# Patient Record
(demographics unavailable — no encounter records)

---

## 2025-03-27 NOTE — PHYSICAL EXAM
[Well Developed] : well developed [Well Nourished] : well nourished [Normal S1, S2] : normal S1, S2 [No Murmur] : no murmur [Normal] : clear lung fields, good air entry, no respiratory distress [Normal Gait] : normal gait [No Edema] : no edema

## 2025-03-27 NOTE — CARDIOLOGY SUMMARY
[de-identified] : NSR [de-identified] : 11/2022 johnnie willis unremarkable  [de-identified] : 2017 treadmill echo unremarkable  [de-identified] : \par  12/2022 LABWORK: normal tsh. . trig 104. thcol 178. hdl 42. a1c 5.5. normal lpa \par  9/2022 Labwork: Grossly normal CRP/ESR/CMP/CK/CBC.  TSH mildly elevated\par  2/2022: LABWORK: tchol 211, , HDL 49, trig 128\par

## 2025-03-27 NOTE — CONSULT LETTER
[Dear  ___] : Dear  [unfilled], [Consult Letter:] : I had the pleasure of evaluating your patient, [unfilled]. [Consult Closing:] : Thank you very much for allowing me to participate in the care of this patient.  If you have any questions, please do not hesitate to contact me. [Sincerely,] : Sincerely, [FreeTextEntry3] : Myriam Ansari, WALTER, ANP-c

## 2025-03-27 NOTE — REASON FOR VISIT
[Symptom and Test Evaluation] : symptom and test evaluation [CV Risk Factors and Non-Cardiac Disease] : CV risk factors and non-cardiac disease [FreeTextEntry3] : Dr. Kodak Alvarez

## 2025-03-27 NOTE — PHYSICAL EXAM
[Fully active, able to carry on all pre-disease performance without restriction] : Status 0 - Fully active, able to carry on all pre-disease performance without restriction [Normal] : affect appropriate [de-identified] : wears glasses

## 2025-03-27 NOTE — REVIEW OF SYSTEMS
[Vision Problems] : vision problems [SOB on Exertion] : shortness of breath during exertion [Diarrhea: Grade 0] : Diarrhea: Grade 0 [Fever] : no fever [Chills] : no chills [Night Sweats] : no night sweats [Fatigue] : no fatigue [Loss of Hearing] : no loss of hearing [Nosebleeds] : no nosebleeds [Chest Pain] : no chest pain [Palpitations] : no palpitations [Lower Ext Edema] : no lower extremity edema [Shortness Of Breath] : no shortness of breath [Wheezing] : no wheezing [Cough] : no cough [Abdominal Pain] : no abdominal pain [Vomiting] : no vomiting [Constipation] : no constipation [Joint Pain] : no joint pain [Joint Stiffness] : no joint stiffness [Skin Rash] : no skin rash [Skin Wound] : no skin wound [Dizziness] : no dizziness [Fainting] : no fainting [Insomnia] : no insomnia [Anxiety] : no anxiety [Depression] : no depression [Muscle Weakness] : no muscle weakness [Easy Bleeding] : no tendency for easy bleeding [Easy Bruising] : no tendency for easy bruising [Swollen Glands] : no swollen glands [FreeTextEntry3] : wears glasses

## 2025-03-27 NOTE — RESULTS/DATA
[FreeTextEntry1] : DIDI JANG is a 65 year--old male who presents for initial evaluation of bilateral PE's.

## 2025-03-27 NOTE — HISTORY OF PRESENT ILLNESS
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 Pt had been having RLQ abd pain and had been sent for CT A/P by his PCP. That scan showed a RLL PE. He was [place] [facility] [place]. He was admitted for 24 hours, placed on heparin gtt and sent home on Eliquis.  He has no prior personal or family h/o clotting. No recent surgery, illness, prolonged travel or prolonged immobilization.    HCM: - Colonoscopy:  -  Dr. Melecio Blevins   SH: - Occupation: Semi retired - Living situation: Lives with family - Smoking/Etoh/illicit drugs: Denies smoking, occasional ETOH, rehab in age 46   FH: Mother-  age 84, MI, obesity Father-  age 88, CVA 1 brother- alive, unknown 1 sister- alive and well, h/o breast cancer, WILTON 1 son- alive and well  1 daughter- alive and well

## 2025-03-27 NOTE — ADDENDUM
[FreeTextEntry1] :  I personally seen the patient and performed the evaluation and management services. I discussed the care with the ACP , reviewed the note, and agree with plan of care as outlined above, except where documented below, which is my personal assessment/plan.  65 year old here for hospital follow up with recent diagnosis of unprovoked PEs.  He is on Eliquis and scheduled to follow-up with hematology.  Also with history of HLD with elevated 10y ASCVD (but normal coronary calcium score/CRP/Lipoprotein A), HTN, PVC, MVP with repair 2007, and CHONG on CPAP.   His echocardiogram is structurally well and normal EKG.  History of unremarkable rhythm monitor. Maintain low dose beta blockade and ARB. CPAP compliance.  Continue with Eliquis as directed by hematology.  Off statin.   Mitral valve repair functioning well. Would need SBE prophylaxis.

## 2025-03-27 NOTE — DISCUSSION/SUMMARY
[EKG obtained to assist in diagnosis and management of assessed problem(s)] : EKG obtained to assist in diagnosis and management of assessed problem(s) [FreeTextEntry1] : 65 year old here for hospital follow up with recent diagnosis of unprovoked PEs.  He is on Eliquis and scheduled to follow-up with hematology.  Also with history of HLD with elevated 10y ASCVD (but normal coronary calcium score/CRP/Lipoprotein A), HTN, PVC, MVP with repair 2007, and CHONG on CPAP.   His echocardiogram is structurally well and normal EKG.  History of unremarkable rhythm monitor. Maintain low dose beta blockade and ARB. CPAP compliance.  Continue with Eliquis as directed by hematology.  Off statin.   Mitral valve repair functioning well. Would need SBE prophylaxis.   Follow yearly.

## 2025-03-27 NOTE — HISTORY OF PRESENT ILLNESS
[FreeTextEntry1] : 64 year old here for cv follow up for HLD with elevated 10y ASCVD (but normal coronary calcium score/CRP/Lipoprotein A), HTN, PVC, MVP with repair 2007, and CHONG on CPAP.   3/27/2025 VISIT:  Was in hospital for PE.  Complained of abominal pain.  CT abdomen revealed lower lobe PE's.  Admitted for further evaluation.  Negative troponins.  Echocardiogram with normal LV and RV function.  No evidence of strain.  He was started on Eliquis and discharged for outpatient follow up.   Noted prior to diagnosis of PE that he was sitting more than normal at his desk.  When he tried to become more active at work, he was very dyspneic with minimal exertion along with the abdominal pain.  This has since resolved.   Currently denies any exertional chest pain, shortness of breath, PND, orthopnea, or edema. He is scheduled to follow-up with hematology today. Reports having recent colonoscopy.  6/2024 VISIT: 1 year f/u. coronary calcium score 0.   3/2023 VISIT: labwork done. started statin but would like to discuss further. echo good valve function today. no angina or dyspnea.   12/2022 VISIT: feels well. did not get echo or bloodwork yet. zio patch result pending. no syncope or presyncope.   11/2022 INITIAL VISIT: Reports intermittent fluttering sensation and strong heartbeat correlates on his monitor with PVC appears isolated.  No angina or dyspnea.  Preserved functional status.  ** TESTING I REVIEWED TODAY excluding above:

## 2025-06-18 NOTE — RESULTS/DATA
[FreeTextEntry1] : DIDI JANG is a 65-year-old male who presents for follow up evaluation of bilateral PEs.

## 2025-06-18 NOTE — REVIEW OF SYSTEMS
[Vision Problems] : vision problems [SOB on Exertion] : shortness of breath during exertion [Diarrhea: Grade 0] : Diarrhea: Grade 0 [Easy Bleeding] : a tendency for easy bleeding [Fever] : no fever [Chills] : no chills [Night Sweats] : no night sweats [Fatigue] : no fatigue [Loss of Hearing] : no loss of hearing [Nosebleeds] : no nosebleeds [Chest Pain] : no chest pain [Palpitations] : no palpitations [Lower Ext Edema] : no lower extremity edema [Shortness Of Breath] : no shortness of breath [Wheezing] : no wheezing [Cough] : no cough [Abdominal Pain] : no abdominal pain [Vomiting] : no vomiting [Constipation] : no constipation [Joint Pain] : no joint pain [Joint Stiffness] : no joint stiffness [Skin Rash] : no skin rash [Skin Wound] : no skin wound [Dizziness] : no dizziness [Fainting] : no fainting [Insomnia] : no insomnia [Anxiety] : no anxiety [Depression] : no depression [Muscle Weakness] : no muscle weakness [Easy Bruising] : no tendency for easy bruising [Swollen Glands] : no swollen glands [FreeTextEntry3] : wears glasses [FreeTextEntry4] : dry mouth [de-identified] : on Eliquis

## 2025-06-18 NOTE — PHYSICAL EXAM
[Fully active, able to carry on all pre-disease performance without restriction] : Status 0 - Fully active, able to carry on all pre-disease performance without restriction [Normal] : affect appropriate [de-identified] : anicteric

## 2025-06-18 NOTE — ADDENDUM
[FreeTextEntry1] : All medical record entries made by the Scribe were at my, Dr. Kemar Mane, direction and personally dictated by me on 06/18/2025. I have reviewed the chart and agree that the record accurately reflects my personal performance of the history, physical exam, assessment and plan. I have also personally directed, reviewed, and agreed with the chart.   I, Charly Aguilar, documented this note as a scribe on behalf of Dr. Kemar Mane on 06/18/2025.

## 2025-06-18 NOTE — CONSULT LETTER
[Dear  ___] : Dear  [unfilled], [Courtesy Letter:] : I had the pleasure of seeing your patient, [unfilled], in my office today. [Sincerely,] : Sincerely, [FreeTextEntry2] : Dr. Kodak Alvarez [FreeTextEntry3] : Kemar Mane MD

## 2025-06-18 NOTE — HISTORY OF PRESENT ILLNESS
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 Pt had been having RLQ abd pain and had been sent for CT A/P by his PCP. That scan showed a RLL PE. [place] [facility] [place] He was admitted for 24 hours, placed on heparin gtt and sent home on Eliquis.  He has no prior personal or family h/o clotting. No recent surgery, illness, prolonged travel or prolonged immobilization.    HCM: - Colonoscopy:  -  Dr. Melecio Blevins   SH: - Occupation: Semi retired - Living situation: Lives with family - Smoking/EtOH/illicit drugs: Denies smoking, occasional ETOH, rehab in age 46   FH: Mother-  age 84, MI, obesity Father-  age 88, CVA 1 brother- alive, unknown 1 sister- alive and well, h/o breast cancer, WILTON 1 son- alive and well  1 daughter- alive and well [de-identified] : He returns today for a follow up.  States that he is doing well. Continues on Eliquis 5mg PO BID. Notes some increased bleeding from superficial cuts and development of dry mouth, but otherwise tolerating. Endorses normal BMs without blood in stool. Denies any upcoming planned surgical procedures.   3/27/25 Labs: - Factor V Leiden: Normal - Prothrombin Gene: Normal - D-Dimer <150 ng/mL DDU